# Patient Record
Sex: FEMALE | Race: WHITE
[De-identification: names, ages, dates, MRNs, and addresses within clinical notes are randomized per-mention and may not be internally consistent; named-entity substitution may affect disease eponyms.]

---

## 2018-04-30 PROBLEM — Z00.00 ENCOUNTER FOR PREVENTIVE HEALTH EXAMINATION: Status: ACTIVE | Noted: 2018-04-30

## 2018-05-08 ENCOUNTER — APPOINTMENT (OUTPATIENT)
Dept: OTOLARYNGOLOGY | Facility: CLINIC | Age: 58
End: 2018-05-08
Payer: COMMERCIAL

## 2018-05-08 VITALS — HEIGHT: 67 IN | WEIGHT: 123 LBS | BODY MASS INDEX: 19.3 KG/M2

## 2018-05-08 DIAGNOSIS — H93.19 TINNITUS, UNSPECIFIED EAR: ICD-10-CM

## 2018-05-08 DIAGNOSIS — J30.1 ALLERGIC RHINITIS DUE TO POLLEN: ICD-10-CM

## 2018-05-08 DIAGNOSIS — H81.43 VERTIGO OF CENTRAL ORIGIN, BILATERAL: ICD-10-CM

## 2018-05-08 DIAGNOSIS — H93.8X9 OTHER SPECIFIED DISORDERS OF EAR, UNSPECIFIED EAR: ICD-10-CM

## 2018-05-08 DIAGNOSIS — Z87.19 PERSONAL HISTORY OF OTHER DISEASES OF THE DIGESTIVE SYSTEM: ICD-10-CM

## 2018-05-08 DIAGNOSIS — H92.02 OTALGIA, LEFT EAR: ICD-10-CM

## 2018-05-08 DIAGNOSIS — Z78.9 OTHER SPECIFIED HEALTH STATUS: ICD-10-CM

## 2018-05-08 PROCEDURE — 92700 UNLISTED ORL SERVICE/PX: CPT

## 2018-05-08 PROCEDURE — 92557 COMPREHENSIVE HEARING TEST: CPT

## 2018-05-08 PROCEDURE — 92567 TYMPANOMETRY: CPT

## 2018-05-08 PROCEDURE — 99244 OFF/OP CNSLTJ NEW/EST MOD 40: CPT | Mod: 25

## 2018-05-08 PROCEDURE — 31231 NASAL ENDOSCOPY DX: CPT

## 2018-05-16 ENCOUNTER — APPOINTMENT (OUTPATIENT)
Dept: OTOLARYNGOLOGY | Facility: CLINIC | Age: 58
End: 2018-05-16

## 2023-09-26 ENCOUNTER — APPOINTMENT (OUTPATIENT)
Dept: OTOLARYNGOLOGY | Facility: CLINIC | Age: 63
End: 2023-09-26
Payer: COMMERCIAL

## 2023-09-26 VITALS
TEMPERATURE: 97.6 F | OXYGEN SATURATION: 99 % | DIASTOLIC BLOOD PRESSURE: 90 MMHG | BODY MASS INDEX: 19.39 KG/M2 | HEIGHT: 67.5 IN | HEART RATE: 97 BPM | WEIGHT: 125 LBS | SYSTOLIC BLOOD PRESSURE: 138 MMHG

## 2023-09-26 DIAGNOSIS — H61.22 IMPACTED CERUMEN, LEFT EAR: ICD-10-CM

## 2023-09-26 PROCEDURE — 99203 OFFICE O/P NEW LOW 30 MIN: CPT | Mod: 25

## 2023-12-13 ENCOUNTER — APPOINTMENT (OUTPATIENT)
Dept: OTOLARYNGOLOGY | Facility: CLINIC | Age: 63
End: 2023-12-13
Payer: COMMERCIAL

## 2023-12-13 DIAGNOSIS — H93.12 TINNITUS, LEFT EAR: ICD-10-CM

## 2023-12-13 DIAGNOSIS — H92.03 OTALGIA, BILATERAL: ICD-10-CM

## 2023-12-13 DIAGNOSIS — H90.42 SENSORINEURAL HEARING LOSS, UNILATERAL, LEFT EAR, WITH UNRESTRICTED HEARING ON THE CONTRALATERAL SIDE: ICD-10-CM

## 2023-12-13 PROCEDURE — 99213 OFFICE O/P EST LOW 20 MIN: CPT

## 2023-12-13 NOTE — HISTORY OF PRESENT ILLNESS
[de-identified] : 2.5 month followup visit for this 62 y/o F with tinnitus, and disequilibrium. If she drinks water she gets vertigo or if sinuses are problematic. She has h/o l asymmetric very high frequency snhl. We recommended VNG and MRI at last visit but she did not have this. She swims and has history of recurrent otitis externa. She has intermittent ear pain.

## 2023-12-13 NOTE — PHYSICAL EXAM
[de-identified] : b signficant TMJ  [Normal] : extraocular movements are normal [de-identified] : gait steady

## 2023-12-13 NOTE — ASSESSMENT
[FreeTextEntry1] : 1. disequilibrium, h/o l very hf snhl -MRI -VNG -many questions answered -offered to recheck - she preferred to hold off 2. b ear pain likely d/t TMJ -soft diet -avoid bruxism/ chewing gum -Aleve BID x 10 days if pt can tolerate -recommended follow up with her dentist for further recommendations and that she wear her mouth guard more frequently RTC with MRI and VNG to review findings; call sooner for any issues

## 2024-01-05 ENCOUNTER — APPOINTMENT (OUTPATIENT)
Dept: OTOLARYNGOLOGY | Facility: CLINIC | Age: 64
End: 2024-01-05
Payer: COMMERCIAL

## 2024-01-05 VITALS
BODY MASS INDEX: 19.39 KG/M2 | SYSTOLIC BLOOD PRESSURE: 148 MMHG | HEIGHT: 67.5 IN | OXYGEN SATURATION: 99 % | HEART RATE: 113 BPM | WEIGHT: 125 LBS | DIASTOLIC BLOOD PRESSURE: 85 MMHG

## 2024-01-05 DIAGNOSIS — H92.03 OTALGIA, BILATERAL: ICD-10-CM

## 2024-01-05 DIAGNOSIS — R68.89 OTHER GENERAL SYMPTOMS AND SIGNS: ICD-10-CM

## 2024-01-05 DIAGNOSIS — R42 DIZZINESS AND GIDDINESS: ICD-10-CM

## 2024-01-05 PROCEDURE — 92540 BASIC VESTIBULAR EVALUATION: CPT

## 2024-01-05 PROCEDURE — 92537 CALORIC VSTBLR TEST W/REC: CPT

## 2024-01-05 PROCEDURE — 99213 OFFICE O/P EST LOW 20 MIN: CPT

## 2024-01-05 NOTE — HISTORY OF PRESENT ILLNESS
[de-identified] : 3 week follow up appt for this 62 yo F with vertigo sensation. 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 She notes that whenever she feels a little vertigo she has postnasal drip and gi upset. When she swims she feels better. Has b otalgia. She had vng but held off recommended mri as she has questions about johnie she wants answered and there was no one at radiology [facility] who could.

## 2024-01-05 NOTE — PHYSICAL EXAM
[Normal] : assessment of respiratory effort is normal [de-identified] : b [de-identified] : gait steady

## 2024-01-05 NOTE — ASSESSMENT
[FreeTextEntry1] : advised soft diet avoid bruxism - given Dr Collazo's contact info for tmj reassured this does not appear to be otologic vertigo as vng is normal rec follow up with mri - she said she will go back to facility and speak to radiologist also recommended she follow up with Dr Jerson Covington to ro cardiac cause and her ophthalmologist

## 2024-01-26 ENCOUNTER — APPOINTMENT (OUTPATIENT)
Dept: OTOLARYNGOLOGY | Facility: CLINIC | Age: 64
End: 2024-01-26